# Patient Record
Sex: MALE | Race: WHITE | NOT HISPANIC OR LATINO | Employment: OTHER | URBAN - NONMETROPOLITAN AREA
[De-identification: names, ages, dates, MRNs, and addresses within clinical notes are randomized per-mention and may not be internally consistent; named-entity substitution may affect disease eponyms.]

---

## 2024-03-04 ENCOUNTER — OFFICE VISIT (OUTPATIENT)
Dept: PRIMARY CARE | Facility: CLINIC | Age: 49
End: 2024-03-04
Payer: MEDICARE

## 2024-03-04 VITALS
DIASTOLIC BLOOD PRESSURE: 82 MMHG | WEIGHT: 133.2 LBS | HEIGHT: 63 IN | HEART RATE: 70 BPM | OXYGEN SATURATION: 98 % | BODY MASS INDEX: 23.6 KG/M2 | SYSTOLIC BLOOD PRESSURE: 126 MMHG

## 2024-03-04 DIAGNOSIS — F17.210 CIGARETTE NICOTINE DEPENDENCE WITHOUT COMPLICATION: ICD-10-CM

## 2024-03-04 DIAGNOSIS — E03.9 HYPOTHYROIDISM, UNSPECIFIED TYPE: Primary | ICD-10-CM

## 2024-03-04 PROBLEM — E03.8 OTHER SPECIFIED HYPOTHYROIDISM: Status: ACTIVE | Noted: 2024-03-04

## 2024-03-04 PROCEDURE — 3008F BODY MASS INDEX DOCD: CPT | Performed by: INTERNAL MEDICINE

## 2024-03-04 PROCEDURE — 99203 OFFICE O/P NEW LOW 30 MIN: CPT | Performed by: INTERNAL MEDICINE

## 2024-03-04 RX ORDER — LEVOTHYROXINE SODIUM 88 UG/1
88 TABLET ORAL DAILY
COMMUNITY
Start: 2024-02-09 | End: 2024-03-04 | Stop reason: SDUPTHER

## 2024-03-04 RX ORDER — LEVOTHYROXINE SODIUM 88 UG/1
88 TABLET ORAL DAILY
Qty: 30 TABLET | Refills: 0 | Status: SHIPPED | OUTPATIENT
Start: 2024-03-04 | End: 2024-04-09 | Stop reason: SDUPTHER

## 2024-03-04 ASSESSMENT — PATIENT HEALTH QUESTIONNAIRE - PHQ9
2. FEELING DOWN, DEPRESSED OR HOPELESS: NOT AT ALL
1. LITTLE INTEREST OR PLEASURE IN DOING THINGS: NOT AT ALL
SUM OF ALL RESPONSES TO PHQ9 QUESTIONS 1 AND 2: 0

## 2024-03-04 NOTE — PROGRESS NOTES
"Subjective   Patient ID: Milton Garcia is a 48 y.o. male who presents for New Patient Visit     HPI    New PCP    Hypothyroidism on rx no side effects    Smoking cessation discussed    Diet / exercise rev'd      Review of Systems   All other systems reviewed and are negative.      Objective   /82   Pulse 70   Ht 1.6 m (5' 3\")   Wt 60.4 kg (133 lb 3.2 oz)   SpO2 98%   BMI 23.60 kg/m²   No results found for: \"WBC\", \"HGB\", \"HCT\", \"PLT\", \"CHOL\", \"TRIG\", \"HDL\", \"LDLDIRECT\", \"ALT\", \"AST\", \"NA\", \"K\", \"CL\", \"CREATININE\", \"BUN\", \"CO2\", \"TSH\", \"PSA\", \"INR\", \"GLUF\", \"HGBA1C\", \"ALBUR\"        Physical Exam  Vitals reviewed.   Constitutional:       Appearance: Normal appearance. He is normal weight.   HENT:      Head: Normocephalic and atraumatic.      Mouth/Throat:      Pharynx: No posterior oropharyngeal erythema.   Eyes:      General: No scleral icterus.     Conjunctiva/sclera: Conjunctivae normal.      Pupils: Pupils are equal, round, and reactive to light.   Cardiovascular:      Rate and Rhythm: Normal rate and regular rhythm.      Heart sounds: Normal heart sounds.   Pulmonary:      Effort: No respiratory distress.      Breath sounds: No wheezing.   Abdominal:      General: Abdomen is flat. Bowel sounds are normal. There is no distension.      Palpations: Abdomen is soft. There is no mass.      Tenderness: There is no abdominal tenderness. There is no rebound.   Musculoskeletal:         General: Normal range of motion.      Cervical back: Normal range of motion and neck supple.   Skin:     General: Skin is warm and dry.   Neurological:      General: No focal deficit present.      Mental Status: He is alert and oriented to person, place, and time. Mental status is at baseline.   Psychiatric:         Mood and Affect: Mood normal.         Behavior: Behavior normal.         Thought Content: Thought content normal.         Judgment: Judgment normal.       Problem List Items Addressed This Visit    None  Visit " Diagnoses         Codes    Hypothyroidism, unspecified type    -  Primary E03.9    Cigarette nicotine dependence without complication     F17.210    BMI 23.0-23.9, adult     Z68.23          Assessment/Plan     New PCP    Hypothyroidism on rx no side effects    Smoking cessation discussed    Diet / exercise rev'd    Prostate none  Colonoscopy none  CT chest lung cancer screening n/a  immunizations rev'd shingrix  BMI 23.6    Check lab results  2 months ago from [place]    Follow up 6 weeks / yearly physical

## 2024-04-09 ENCOUNTER — OFFICE VISIT (OUTPATIENT)
Dept: PRIMARY CARE | Facility: CLINIC | Age: 49
End: 2024-04-09
Payer: MEDICARE

## 2024-04-09 VITALS
WEIGHT: 136.2 LBS | HEIGHT: 63 IN | DIASTOLIC BLOOD PRESSURE: 76 MMHG | HEART RATE: 100 BPM | OXYGEN SATURATION: 99 % | BODY MASS INDEX: 24.13 KG/M2 | SYSTOLIC BLOOD PRESSURE: 132 MMHG

## 2024-04-09 DIAGNOSIS — E03.9 HYPOTHYROIDISM, UNSPECIFIED TYPE: ICD-10-CM

## 2024-04-09 DIAGNOSIS — E55.9 VITAMIN D DEFICIENCY: ICD-10-CM

## 2024-04-09 DIAGNOSIS — Z83.3 FAMILY HISTORY OF DIABETES MELLITUS: ICD-10-CM

## 2024-04-09 DIAGNOSIS — F17.210 CIGARETTE NICOTINE DEPENDENCE WITHOUT COMPLICATION: ICD-10-CM

## 2024-04-09 DIAGNOSIS — Z00.00 ANNUAL PHYSICAL EXAM: Primary | ICD-10-CM

## 2024-04-09 DIAGNOSIS — Z12.11 ENCOUNTER FOR SCREENING FOR MALIGNANT NEOPLASM OF COLON: ICD-10-CM

## 2024-04-09 DIAGNOSIS — Z12.5 ENCOUNTER FOR SCREENING FOR MALIGNANT NEOPLASM OF PROSTATE: ICD-10-CM

## 2024-04-09 PROCEDURE — 99396 PREV VISIT EST AGE 40-64: CPT | Performed by: INTERNAL MEDICINE

## 2024-04-09 PROCEDURE — 99214 OFFICE O/P EST MOD 30 MIN: CPT | Performed by: INTERNAL MEDICINE

## 2024-04-09 PROCEDURE — 3008F BODY MASS INDEX DOCD: CPT | Performed by: INTERNAL MEDICINE

## 2024-04-09 RX ORDER — LEVOTHYROXINE SODIUM 88 UG/1
88 TABLET ORAL DAILY
Qty: 30 TABLET | Refills: 0 | Status: SHIPPED | OUTPATIENT
Start: 2024-04-09 | End: 2024-05-13 | Stop reason: SDUPTHER

## 2024-04-09 ASSESSMENT — PATIENT HEALTH QUESTIONNAIRE - PHQ9
1. LITTLE INTEREST OR PLEASURE IN DOING THINGS: NOT AT ALL
SUM OF ALL RESPONSES TO PHQ9 QUESTIONS 1 AND 2: 0
2. FEELING DOWN, DEPRESSED OR HOPELESS: NOT AT ALL

## 2024-04-09 NOTE — PROGRESS NOTES
"Subjective   Patient ID: Milton Garcia is a 48 y.o. male who presents for Annual Exam (Yearly )  and follow up    HPI    Yearly physical    Prostate none  Colonoscopy none  CT chest lung cancer screening none  immunizations rev'd shingrix  BMI 24.1    Follow up    Feels well    Hypothyroidism on rx no side effects     Smoking cessation discussed    Family history of DM     Diet / exercise rev'd    Review of Systems   All other systems reviewed and are negative.      Objective   /76   Pulse 100   Ht 1.6 m (5' 3\")   Wt 61.8 kg (136 lb 3.2 oz)   SpO2 99%   BMI 24.13 kg/m²   No results found for: \"WBC\", \"HGB\", \"HCT\", \"PLT\", \"CHOL\", \"TRIG\", \"HDL\", \"LDLDIRECT\", \"ALT\", \"AST\", \"NA\", \"K\", \"CL\", \"CREATININE\", \"BUN\", \"CO2\", \"TSH\", \"PSA\", \"INR\", \"GLUF\", \"HGBA1C\", \"ALBUR\"        Physical Exam  Vitals reviewed.   Constitutional:       Appearance: Normal appearance. He is normal weight.   HENT:      Head: Normocephalic and atraumatic.      Mouth/Throat:      Pharynx: No posterior oropharyngeal erythema.   Eyes:      General: No scleral icterus.     Conjunctiva/sclera: Conjunctivae normal.      Pupils: Pupils are equal, round, and reactive to light.   Cardiovascular:      Rate and Rhythm: Normal rate and regular rhythm.      Heart sounds: Normal heart sounds.   Pulmonary:      Effort: No respiratory distress.      Breath sounds: No wheezing.   Abdominal:      General: Abdomen is flat. Bowel sounds are normal. There is no distension.      Palpations: Abdomen is soft. There is no mass.      Tenderness: There is no abdominal tenderness. There is no rebound.   Musculoskeletal:         General: Normal range of motion.      Cervical back: Normal range of motion and neck supple.   Skin:     General: Skin is warm and dry.   Neurological:      General: No focal deficit present.      Mental Status: He is alert and oriented to person, place, and time. Mental status is at baseline.   Psychiatric:         Mood and Affect: Mood " normal.         Behavior: Behavior normal.         Thought Content: Thought content normal.         Judgment: Judgment normal.         Problem List Items Addressed This Visit    None  Visit Diagnoses         Codes    Annual physical exam    -  Primary Z00.00    Relevant Orders    Comprehensive Metabolic Panel    Lipid Panel    TSH with reflex to Free T4 if abnormal    Hypothyroidism, unspecified type     E03.9    Relevant Medications    levothyroxine (Synthroid, Levoxyl) 88 mcg tablet    Cigarette nicotine dependence without complication     F17.210    Relevant Orders    CBC and Auto Differential    CT lung screening low dose    Encounter for screening for malignant neoplasm of prostate     Z12.5    Relevant Orders    Prostate Specific Antigen, Screen    Vitamin D deficiency     E55.9    Relevant Orders    Vitamin D 25-Hydroxy,Total (for eval of Vitamin D levels)    Encounter for screening for malignant neoplasm of colon     Z12.11    Relevant Orders    Referral to Gastroenterology    Family history of diabetes mellitus     Z83.3    Relevant Orders    Hemoglobin A1c    BMI 24.0-24.9, adult     Z68.24          Assessment/Plan     Yearly physical    Prostate none  Colonoscopy none  CT chest lung cancer screening none  immunizations rev'd shingrix  BMI 24.1    Follow up    Feels well    Hypothyroidism on rx no side effects     Smoking cessation discussed    Family history of DM     Diet / exercise rev'd    Check labs, CT lung cancer screening  Colonoscopy recommended and ordered    Follow up 3 weeks

## 2024-04-11 ENCOUNTER — LAB (OUTPATIENT)
Dept: LAB | Facility: LAB | Age: 49
End: 2024-04-11
Payer: MEDICARE

## 2024-04-11 DIAGNOSIS — Z12.5 ENCOUNTER FOR SCREENING FOR MALIGNANT NEOPLASM OF PROSTATE: ICD-10-CM

## 2024-04-11 DIAGNOSIS — Z00.00 ANNUAL PHYSICAL EXAM: ICD-10-CM

## 2024-04-11 DIAGNOSIS — Z83.3 FAMILY HISTORY OF DIABETES MELLITUS: ICD-10-CM

## 2024-04-11 DIAGNOSIS — E55.9 VITAMIN D DEFICIENCY: ICD-10-CM

## 2024-04-11 DIAGNOSIS — F17.210 CIGARETTE NICOTINE DEPENDENCE WITHOUT COMPLICATION: ICD-10-CM

## 2024-04-11 LAB
25(OH)D3 SERPL-MCNC: 15 NG/ML (ref 30–100)
ALBUMIN SERPL BCP-MCNC: 4.5 G/DL (ref 3.4–5)
ALP SERPL-CCNC: 48 U/L (ref 33–120)
ALT SERPL W P-5'-P-CCNC: 54 U/L (ref 10–52)
ANION GAP SERPL CALC-SCNC: 14 MMOL/L (ref 10–20)
AST SERPL W P-5'-P-CCNC: 28 U/L (ref 9–39)
BASOPHILS # BLD AUTO: 0.07 X10*3/UL (ref 0–0.1)
BASOPHILS NFR BLD AUTO: 1.1 %
BILIRUB SERPL-MCNC: 1 MG/DL (ref 0–1.2)
BUN SERPL-MCNC: 13 MG/DL (ref 6–23)
CALCIUM SERPL-MCNC: 9.7 MG/DL (ref 8.6–10.3)
CHLORIDE SERPL-SCNC: 102 MMOL/L (ref 98–107)
CHOLEST SERPL-MCNC: 254 MG/DL (ref 0–199)
CHOLESTEROL/HDL RATIO: 5.3
CO2 SERPL-SCNC: 28 MMOL/L (ref 21–32)
CREAT SERPL-MCNC: 0.85 MG/DL (ref 0.5–1.3)
EGFRCR SERPLBLD CKD-EPI 2021: >90 ML/MIN/1.73M*2
EOSINOPHIL # BLD AUTO: 0.15 X10*3/UL (ref 0–0.7)
EOSINOPHIL NFR BLD AUTO: 2.4 %
ERYTHROCYTE [DISTWIDTH] IN BLOOD BY AUTOMATED COUNT: 12.7 % (ref 11.5–14.5)
EST. AVERAGE GLUCOSE BLD GHB EST-MCNC: 103 MG/DL
GLUCOSE SERPL-MCNC: 96 MG/DL (ref 74–99)
HBA1C MFR BLD: 5.2 %
HCT VFR BLD AUTO: 43.9 % (ref 41–52)
HDLC SERPL-MCNC: 48.1 MG/DL
HGB BLD-MCNC: 14.4 G/DL (ref 13.5–17.5)
IMM GRANULOCYTES # BLD AUTO: 0.01 X10*3/UL (ref 0–0.7)
IMM GRANULOCYTES NFR BLD AUTO: 0.2 % (ref 0–0.9)
LDLC SERPL CALC-MCNC: 187 MG/DL
LYMPHOCYTES # BLD AUTO: 2.09 X10*3/UL (ref 1.2–4.8)
LYMPHOCYTES NFR BLD AUTO: 32.9 %
MCH RBC QN AUTO: 30.8 PG (ref 26–34)
MCHC RBC AUTO-ENTMCNC: 32.8 G/DL (ref 32–36)
MCV RBC AUTO: 94 FL (ref 80–100)
MONOCYTES # BLD AUTO: 0.61 X10*3/UL (ref 0.1–1)
MONOCYTES NFR BLD AUTO: 9.6 %
NEUTROPHILS # BLD AUTO: 3.43 X10*3/UL (ref 1.2–7.7)
NEUTROPHILS NFR BLD AUTO: 53.8 %
NON HDL CHOLESTEROL: 206 MG/DL (ref 0–149)
NRBC BLD-RTO: 0 /100 WBCS (ref 0–0)
PLATELET # BLD AUTO: 297 X10*3/UL (ref 150–450)
POTASSIUM SERPL-SCNC: 4.5 MMOL/L (ref 3.5–5.3)
PROT SERPL-MCNC: 7.1 G/DL (ref 6.4–8.2)
PSA SERPL-MCNC: 1.96 NG/ML
RBC # BLD AUTO: 4.68 X10*6/UL (ref 4.5–5.9)
SODIUM SERPL-SCNC: 139 MMOL/L (ref 136–145)
TRIGL SERPL-MCNC: 96 MG/DL (ref 0–149)
TSH SERPL-ACNC: 2.28 MIU/L (ref 0.44–3.98)
VLDL: 19 MG/DL (ref 0–40)
WBC # BLD AUTO: 6.4 X10*3/UL (ref 4.4–11.3)

## 2024-04-11 PROCEDURE — G0103 PSA SCREENING: HCPCS

## 2024-04-11 PROCEDURE — 82306 VITAMIN D 25 HYDROXY: CPT

## 2024-04-11 PROCEDURE — 36415 COLL VENOUS BLD VENIPUNCTURE: CPT

## 2024-04-11 PROCEDURE — 83036 HEMOGLOBIN GLYCOSYLATED A1C: CPT

## 2024-04-25 ENCOUNTER — HOSPITAL ENCOUNTER (OUTPATIENT)
Dept: RADIOLOGY | Facility: HOSPITAL | Age: 49
Discharge: HOME | End: 2024-04-25
Payer: MEDICARE

## 2024-04-25 DIAGNOSIS — F17.210 CIGARETTE NICOTINE DEPENDENCE WITHOUT COMPLICATION: ICD-10-CM

## 2024-04-25 PROCEDURE — 71271 CT THORAX LUNG CANCER SCR C-: CPT

## 2024-05-06 ENCOUNTER — OFFICE VISIT (OUTPATIENT)
Dept: PRIMARY CARE | Facility: CLINIC | Age: 49
End: 2024-05-06
Payer: MEDICARE

## 2024-05-06 ENCOUNTER — HOSPITAL ENCOUNTER (OUTPATIENT)
Dept: RADIOLOGY | Facility: CLINIC | Age: 49
Discharge: HOME | End: 2024-05-06
Payer: MEDICARE

## 2024-05-06 VITALS
TEMPERATURE: 98.2 F | DIASTOLIC BLOOD PRESSURE: 64 MMHG | SYSTOLIC BLOOD PRESSURE: 112 MMHG | WEIGHT: 137.8 LBS | OXYGEN SATURATION: 99 % | HEART RATE: 68 BPM | BODY MASS INDEX: 24.41 KG/M2

## 2024-05-06 DIAGNOSIS — E03.9 HYPOTHYROIDISM, UNSPECIFIED TYPE: ICD-10-CM

## 2024-05-06 DIAGNOSIS — Z71.2 ENCOUNTER TO DISCUSS TEST RESULTS: Primary | ICD-10-CM

## 2024-05-06 DIAGNOSIS — F17.210 CIGARETTE NICOTINE DEPENDENCE WITHOUT COMPLICATION: ICD-10-CM

## 2024-05-06 DIAGNOSIS — E55.9 VITAMIN D DEFICIENCY: ICD-10-CM

## 2024-05-06 DIAGNOSIS — M25.521 RIGHT ELBOW PAIN: ICD-10-CM

## 2024-05-06 DIAGNOSIS — R93.89 ABNORMAL CT OF THE CHEST: ICD-10-CM

## 2024-05-06 DIAGNOSIS — E78.00 HYPERCHOLESTEREMIA: ICD-10-CM

## 2024-05-06 DIAGNOSIS — Z13.9 SCREENING DUE: ICD-10-CM

## 2024-05-06 PROCEDURE — 99214 OFFICE O/P EST MOD 30 MIN: CPT | Performed by: INTERNAL MEDICINE

## 2024-05-06 PROCEDURE — 73080 X-RAY EXAM OF ELBOW: CPT | Mod: RIGHT SIDE | Performed by: RADIOLOGY

## 2024-05-06 PROCEDURE — 73080 X-RAY EXAM OF ELBOW: CPT | Mod: RT

## 2024-05-06 PROCEDURE — 3008F BODY MASS INDEX DOCD: CPT | Performed by: INTERNAL MEDICINE

## 2024-05-06 PROCEDURE — G0446 INTENS BEHAVE THER CARDIO DX: HCPCS | Performed by: INTERNAL MEDICINE

## 2024-05-06 RX ORDER — ERGOCALCIFEROL 1.25 MG/1
1.25 CAPSULE ORAL
Qty: 4 CAPSULE | Refills: 2 | Status: SHIPPED | OUTPATIENT
Start: 2024-05-12 | End: 2024-08-10

## 2024-05-06 NOTE — PROGRESS NOTES
"Subjective   Patient ID: Milton Garcia \"Carter\" is a 48 y.o. male who presents for Follow-up (3 weeks) and Elbow Pain (Joint pain- right elbow x 4 days).  Elbow Pain      Follow up tests    Abnormal CT chest  Pulm consult    Rt elbow pain x 4 days  No known trauma  Likely musculoskeletal   Check x ray    Hypercholesterolemia  Diet discussed  Check CT cardiac score     Hypothyroidism on rx no side effects    Vit D deficiency   Start 50,000 units weekly x 12 weeks  Then recheck for daily dosing  Risk / benefits rev'd     Smoking cessation discussed     Family history of DM     Diet / exercise rev'd     Check CT cardiac screening  Colonoscopy pending    Review of Systems   All other systems reviewed and are negative.      Objective   /64   Pulse 68   Temp 36.8 °C (98.2 °F)   Wt 62.5 kg (137 lb 12.8 oz)   SpO2 99%   BMI 24.41 kg/m²   Lab Results   Component Value Date    WBC 6.4 04/11/2024    HGB 14.4 04/11/2024    HCT 43.9 04/11/2024     04/11/2024    CHOL 254 (H) 04/11/2024    TRIG 96 04/11/2024    HDL 48.1 04/11/2024    ALT 54 (H) 04/11/2024    AST 28 04/11/2024     04/11/2024    K 4.5 04/11/2024     04/11/2024    CREATININE 0.85 04/11/2024    BUN 13 04/11/2024    CO2 28 04/11/2024    TSH 2.28 04/11/2024    HGBA1C 5.2 04/11/2024           Physical Exam  Vitals reviewed.   Constitutional:       Appearance: Normal appearance. He is normal weight.   HENT:      Head: Normocephalic and atraumatic.      Mouth/Throat:      Pharynx: No posterior oropharyngeal erythema.   Eyes:      General: No scleral icterus.     Conjunctiva/sclera: Conjunctivae normal.      Pupils: Pupils are equal, round, and reactive to light.   Cardiovascular:      Rate and Rhythm: Normal rate and regular rhythm.      Heart sounds: Normal heart sounds.   Pulmonary:      Effort: No respiratory distress.      Breath sounds: No wheezing.   Abdominal:      General: Abdomen is flat. Bowel sounds are normal. There is no distension. "      Palpations: Abdomen is soft. There is no mass.      Tenderness: There is no abdominal tenderness. There is no rebound.   Musculoskeletal:         General: Normal range of motion.      Cervical back: Normal range of motion and neck supple.      Comments: Rt lateral epicondylitis   Skin:     General: Skin is warm and dry.   Neurological:      General: No focal deficit present.      Mental Status: He is alert and oriented to person, place, and time. Mental status is at baseline.   Psychiatric:         Mood and Affect: Mood normal.         Behavior: Behavior normal.         Thought Content: Thought content normal.         Judgment: Judgment normal.         Problem List Items Addressed This Visit    None  Visit Diagnoses         Codes    Encounter to discuss test results    -  Primary Z71.2    Right elbow pain     M25.521    Relevant Orders    XR elbow right 3+ views    Abnormal CT of the chest     R93.89    Relevant Orders    Referral to Pulmonology    Hypercholesteremia     E78.00    Hypothyroidism, unspecified type     E03.9    Vitamin D deficiency     E55.9    Relevant Medications    ergocalciferol (Vitamin D-2) 1.25 MG (53341 UT) capsule (Start on 5/12/2024)    Cigarette nicotine dependence without complication     F17.210    Screening due     Z13.9    Relevant Orders    CT cardiac scoring wo IV contrast    BMI 24.0-24.9, adult     Z68.24          Assessment/Plan     Follow up tests    Abnormal CT chest  Pulm consult    Rt elbow pain x 4 days  No known trauma  Likely musculoskeletal   Check x ray  Otc as directed    Hypercholesterolemia  Diet discussed  Check CT cardiac score  I spent 15 minutes face-to-face with this individual discussing their cardiovascular risk and behavioral therapies of nutritional choices, exercise, and elimination of habits contributing to risk. We agreed on plan how they may be able to reduce their current cardiovascular risk.  Patient 10 year cardiac risk estimate calculates :   8.8 %       Hypothyroidism stable on rx no side effects    Vit D deficiency   Start 50,000 units weekly x 12 weeks  Then recheck for daily dosing  Risk / benefits rev'd     Smoking cessation discussed     Family history of DM     Diet / exercise rev'd     Check CT cardiac screening  Colonoscopy pending    Prostate 4-24  Colonoscopy pending  CT chest lung cancer screening 4-24  immunizations rev'd shingrix  BMI 24.4    Follow up 7 weeks

## 2024-05-12 DIAGNOSIS — M25.521 RIGHT ELBOW PAIN: Primary | ICD-10-CM

## 2024-05-13 DIAGNOSIS — E03.9 HYPOTHYROIDISM, UNSPECIFIED TYPE: ICD-10-CM

## 2024-05-13 RX ORDER — LEVOTHYROXINE SODIUM 88 UG/1
88 TABLET ORAL DAILY
Qty: 30 TABLET | Refills: 1 | Status: SHIPPED | OUTPATIENT
Start: 2024-05-13

## 2024-05-15 DIAGNOSIS — M25.521 RIGHT ELBOW PAIN: ICD-10-CM

## 2024-05-16 ENCOUNTER — EVALUATION (OUTPATIENT)
Dept: OCCUPATIONAL THERAPY | Facility: HOSPITAL | Age: 49
End: 2024-05-16
Payer: MEDICARE

## 2024-05-16 DIAGNOSIS — M25.521 RIGHT ELBOW PAIN: Primary | ICD-10-CM

## 2024-05-16 PROCEDURE — 97110 THERAPEUTIC EXERCISES: CPT | Mod: GO | Performed by: OCCUPATIONAL THERAPIST

## 2024-05-16 PROCEDURE — 97165 OT EVAL LOW COMPLEX 30 MIN: CPT | Mod: GO | Performed by: OCCUPATIONAL THERAPIST

## 2024-05-16 ASSESSMENT — LIFESTYLE VARIABLES
AUDIT-C TOTAL SCORE: 2
HOW MANY STANDARD DRINKS CONTAINING ALCOHOL DO YOU HAVE ON A TYPICAL DAY: 1 OR 2
SKIP TO QUESTIONS 9-10: 1
HOW OFTEN DO YOU HAVE A DRINK CONTAINING ALCOHOL: 2-4 TIMES A MONTH
HOW OFTEN DO YOU HAVE SIX OR MORE DRINKS ON ONE OCCASION: NEVER

## 2024-05-16 ASSESSMENT — ENCOUNTER SYMPTOMS
DEPRESSION: 0
OCCASIONAL FEELINGS OF UNSTEADINESS: 0
LOSS OF SENSATION IN FEET: 0

## 2024-05-16 ASSESSMENT — ACTIVITIES OF DAILY LIVING (ADL)
BATHING_ASSISTANCE: INDEPENDENT
ADL_ASSISTANCE: INDEPENDENT

## 2024-05-16 ASSESSMENT — COLUMBIA-SUICIDE SEVERITY RATING SCALE - C-SSRS
1. IN THE PAST MONTH, HAVE YOU WISHED YOU WERE DEAD OR WISHED YOU COULD GO TO SLEEP AND NOT WAKE UP?: NO
6. HAVE YOU EVER DONE ANYTHING, STARTED TO DO ANYTHING, OR PREPARED TO DO ANYTHING TO END YOUR LIFE?: TEENAGE YEARS
2. HAVE YOU ACTUALLY HAD ANY THOUGHTS OF KILLING YOURSELF?: NO
6. HAVE YOU EVER DONE ANYTHING, STARTED TO DO ANYTHING, OR PREPARED TO DO ANYTHING TO END YOUR LIFE?: YES
6. HAVE YOU EVER DONE ANYTHING, STARTED TO DO ANYTHING, OR PREPARED TO DO ANYTHING TO END YOUR LIFE?: NO

## 2024-05-16 ASSESSMENT — PAIN - FUNCTIONAL ASSESSMENT: PAIN_FUNCTIONAL_ASSESSMENT: 0-10

## 2024-05-16 ASSESSMENT — PAIN SCALES - GENERAL: PAINLEVEL_OUTOF10: 1

## 2024-05-16 NOTE — PROGRESS NOTES
"  Occupational Therapy  Occupational Therapy Evaluation    Patient Name: Milton Garcia \"Ed"  MRN: [id number]  : 1975  Today's Date: 2024  Time Calculation  Start Time: 1348  Stop Time: 1430  Time Calculation (min): 42 min  Today's Charges:  OT Evaluation Time Entry  OT Evaluation (Low) Time Entry: 28  OT Therapeutic Procedures Time Entry  Therapeutic Exercise Time Entry: 14    Current Problem  Problem List Items Addressed This Visit             ICD-10-CM    Right elbow pain - Primary M25.521    Relevant Orders    Follow Up In Occupational Therapy     Insurance  Payor: [employer] / Plan: [employer] ADVANTAGE / Product Type: *No Product type* /     Assessment  OT Assessment  OT Assessment Results: Decreased upper extremity strength, Decreased endurance, Decreased IADLs  Strengths: Ability to acquire knowledge, Attitude of self, Support and attitude of living partners  Clinical Presentation: Stable and/or uncomplicated characteristics  Pt. Tolerated session satisfactorily.  Patient is a 49 yo male  s/p unknown elbow injury resulting in limited participation in pain-free ADLs and inability to perform at their prior level of function. Pt. Reports difficulty grasping items and completing yard work with use of R hand. Pt. Displays decreased RUE strength, decreased IADLs and decreased grasp. Pt would benefit from occupational therapy to address the above impairments in order to return to safe and pain-free ADLs and prior level of function.    Plan  Outpatient Plan  OT Plan: ; review elbow stretching  Frequency: 1x/wk  Duration: 6 weeks  Onset Date: 24  Certification Period Start Date: 24  Certification Period End Date: 24  Number of Treatments Authorized: needs auth  Rehab Potential: Good  Plan of Care Agreement: Patient  Planned Interventions: Therapeutic Exercise (82191), Therapeutic Activity (47967), Self-Care/Home Management (86762), Manual Therapy (62522), Neuromuscular " "Re-education (68095), Ultrasound (14165), Kinesiotaping, Hot Packs, and Cold Packs    General  OT Last Visit  OT Received On: 05/16/24  Reason for Referral: R elbow pain  Referred By: Dr Edmond  Family/Caregiver Present: No  Previous Tests/Imaging): x ray - showed tricep bone spur  Medical History Form: Reviewed and scanned into chart   Previous Interventions/Treatments: None  Primary Language: English  Preferred Learning Style: kinesthetic    Red Flags:   Do you have any of the following? No  Osteoporosis    Balance Difficulties/Falls  H/o CA Fever/chills   AM Stiffness Pacemaker Unexplained Weight Changes  Dizziness/Fainting     Unexplained Change in Bowel or Bladder Functions   Unexplained Malaise or Muscle Weakness  Night Pain/Sweats       Social Determinants of Health issues: No  Money  Unemployed/ work conditions  Education/Literacy Childhood experiences   Physical home environment Social supports/coping skills Healthy behaviors Access to healthcare     Subjective  General Comment: \"could my low vitamin D be affecting this?\"  Current condition since injury: Same    Pt.'s goals for therapy: \"make sure the pain is gone to get back my yard work, mowing cutting trees, bushes filling in ditches.\"    Precautions  Precautions  STEADI Fall Risk Score (The score of 4 or more indicates an increased risk of falling): 1  Medical Precautions: No known precautions/limitation    Pain  Pain Assessment  Pain Assessment: 0-10  Pain Score: 1  Pain Type: Acute pain  Pain Location: Elbow  Pain Orientation: Right  Pain Descriptors:  (irritating)  As a result of this session, pt. reported pain increased .  Aggravating Factors: Lifting/Carrying  and Pulling/Pushing   Relieving Factors:  icy hot  and ibuprofen    Cognition  Cognition  Overall Cognitive Status: Within Functional Limits       Prior Level of Function/Home Living   Prior Function Per Pt/Caregiver Report  Level of [place]: Independent with ADLs and functional " transfers, Independent with homemaking with ambulation  Receives Help From: Family  ADL Assistance: Independent  Homemaking Assistance: Independent  Ambulatory Assistance: Independent  Vocational: On disability (social security)  Leisure: fishing, bicycle riding, video games  Hand Dominance: Left  IADL History  Homemaking Responsibilities: Yes  Meal Prep Responsibility: Secondary  Laundry Responsibility: Secondary  Cleaning Responsibility: Secondary  Bill Paying/Finance Responsibility: Secondary  Shopping Responsibility: Secondary  Current License: No  Home Living  Type of Home: House  Lives With: Other (Comment) (family)  Home Living Comments: no concerns  Concerns with home environment? No    Current ADL/IADL Function  ADL Function  ADL  Eating Assistance: Independent  Grooming Assistance: Independent  Bathing Assistance: Independent  UE Dressing Assistance: Independent  LE Dressing Assistance: Independent  Toileting Assistance with Device: Independent  ADL Comments: anything with holding on with R arm makes my elbow hurt  IADL Function  Patient reports    Coordination  Coordination  Movements are Fluid and Coordinated: Yes    Hand Function  Hand Function  Gross Grasp: Functional  Coordination: Functional    ROM/Strength  RUE   RUE : Within Functional Limits  Right Hand Strength -  (lbs)  Handle Setting 2 (lbs): 40.33 lbs (39, 43, 39)  Right Hand Strength - Pinch (lbs)  Lateral (lbs): 14.67 lbs (16, 14, 14)  Tip 2 Point (lbs): 10 lbs (10, 10, 10)  Three Jaw Edd (lbs): 13 lbs (12, 14, 13)  LUE   LUE: Within Functional Limits  Left Hand Strength -  (lbs)  Handle Setting 2 (lbs): 71.67 lbs (75, 66, 74)  Left Hand Strength - Pinch (lbs)  Lateral (lbs): 16 lbs (18, 15, 15)  Tip 2 Point (lbs): 11.33 lbs (10, 12, 12)  Three Jaw Edd (lbs): 17 lbs (17, 16, 18)    Treatment  This therapist instructed and demonstrated interventions to patient, patient completed the following under direct supervision of this  therapist:  Therapeutic Exercise  Therapeutic Exercise Performed: Yes ROM and strengthening exercises completed this date. Elbow extension stretch completed today in standing and supine on mat table. Patient completed in supinated, pronated and neutal positions x5 reps with 15-30s holds as tolerated. Handout provided to patient for home completion.     Education  Education  Individual(s) Educated: Patient  Education Provided: Anatomy & Physiology, Diagnosis & Precautions, Risk and benefits of OT discussed with patient or other, POC discussed and agreed upon  Home Program: PROM, Handout issued  Risk and Benefits Discussed with Patient/Caregiver/Other: yes  Patient/Caregiver Demonstrated Understanding: yes  Plan of Care Discussed and Agreed Upon: yes  Patient Response to Education: Patient/Caregiver Verbalized Understanding of Information, Patient/Caregiver Performed Return Demonstration of Exercises/Activities    HEP Provided Today: Yes - elbow extension stretches  Access Code: [email or web address]  URL: [email or web address]  Date: 05/16/2024  Prepared by: Erum Tate    Exercises  - Supine Elbow Extension Stretch in Supination  - 2 x daily - 5 x weekly - 1 sets - 5 reps - 30 hold  - Standing Elbow Extension with Towel Roll at Wall - Supination  - 2 x daily - 5 x weekly - 1 sets - 5 reps - 30 hold  - Supine Elbow Extension Stretch with Weight  - 2 x daily - 5 x weekly - 1 sets - 5 reps - 30 hold    Outcome Measures  OT Adult Other Outcome Measures  9 Hole Peg Test: RUE: 21.4s; LUE:19.8s  Other Outcome Measures: Quick DASH: 28 raw = 45 (score out of 10 questions answered)      Goals  Active       OT Goals       Pt. will be able to perform self-care, household, work, and or leisure tasks with 0-2/10 pain rating, 100% of the time        Start:  05/16/24    Expected End:  06/28/24       28 raw = 45 on 10 points         Pt. will demonstrate MI with stating and demonstrating home exercise program in order to  improve patient's ability to perform self-care, household, and/or leisure tasks.        Start:  05/16/24    Expected End:  06/07/24            Pt. will increase RUE hand strength to increase participation in self-care, household, and leisure tasks. : 64lbs, three jaw: 15lbs        Start:  05/16/24    Expected End:  06/28/24            Pt. will be able to perform self-care, household, work, and or leisure tasks with 0-2/10 pain rating, 100% of the time        Start:  05/16/24    Expected End:  06/28/24            Pt. will engage in activities related to heavy yard work with independence and no report of pain to return to PLOF.        Start:  05/16/24    Expected End:  06/28/24                ROBINA Calix/L

## 2024-05-23 ENCOUNTER — DOCUMENTATION (OUTPATIENT)
Dept: OCCUPATIONAL THERAPY | Facility: HOSPITAL | Age: 49
End: 2024-05-23
Payer: MEDICARE

## 2024-05-23 ENCOUNTER — APPOINTMENT (OUTPATIENT)
Dept: OCCUPATIONAL THERAPY | Facility: HOSPITAL | Age: 49
End: 2024-05-23
Payer: MEDICARE

## 2024-05-23 NOTE — PROGRESS NOTES
"Occupational Therapy  Therapy Communication Note    Patient Name: Milton Garcia \"Carter\"  MRN: [id number]  Today's Date: 5/23/2024     Discipline: Occupational Therapy    Missed Visit Reason:      Missed Time: Cancel    Comment: Patient canceled OT appt today without reason.   "

## 2024-05-30 ENCOUNTER — DOCUMENTATION (OUTPATIENT)
Dept: OCCUPATIONAL THERAPY | Facility: HOSPITAL | Age: 49
End: 2024-05-30
Payer: MEDICARE

## 2024-05-30 NOTE — PROGRESS NOTES
"Occupational Therapy  Therapy Communication Note    Patient Name: Milton Garcia \"Carter\"  MRN: [id number]  Today's Date: 5/30/2024     Discipline: Occupational Therapy    Missed Time: No Show    Comment: Phoned patient and spoke with him about upcoming appointment. Patient reported he got busy and forgot about appt.   "

## 2024-06-06 ENCOUNTER — APPOINTMENT (OUTPATIENT)
Dept: OCCUPATIONAL THERAPY | Facility: HOSPITAL | Age: 49
End: 2024-06-06
Payer: MEDICARE

## 2024-06-07 ENCOUNTER — APPOINTMENT (OUTPATIENT)
Dept: GASTROENTEROLOGY | Facility: CLINIC | Age: 49
End: 2024-06-07
Payer: MEDICARE

## 2024-06-11 ENCOUNTER — APPOINTMENT (OUTPATIENT)
Dept: GASTROENTEROLOGY | Facility: CLINIC | Age: 49
End: 2024-06-11
Payer: MEDICARE

## 2024-06-12 PROBLEM — F17.200 NICOTINE DEPENDENCE: Status: RESOLVED | Noted: 2024-04-11 | Resolved: 2024-06-12

## 2024-06-12 PROBLEM — R93.89 ABNORMAL COMPUTERIZED AXIAL TOMOGRAPHY OF CHEST: Status: RESOLVED | Noted: 2024-06-12 | Resolved: 2024-06-12

## 2024-06-12 PROBLEM — E78.00 HYPERCHOLESTEROLEMIA: Status: RESOLVED | Noted: 2024-06-12 | Resolved: 2024-06-12

## 2024-06-12 PROBLEM — E55.9 VITAMIN D DEFICIENCY: Status: RESOLVED | Noted: 2024-04-11 | Resolved: 2024-06-12

## 2024-06-13 ENCOUNTER — APPOINTMENT (OUTPATIENT)
Dept: OCCUPATIONAL THERAPY | Facility: HOSPITAL | Age: 49
End: 2024-06-13
Payer: MEDICARE

## 2024-06-14 ENCOUNTER — DOCUMENTATION (OUTPATIENT)
Dept: OCCUPATIONAL THERAPY | Facility: HOSPITAL | Age: 49
End: 2024-06-14
Payer: MEDICARE

## 2024-06-14 NOTE — PROGRESS NOTES
"Occupational Therapy  Therapy Communication Note    Patient Name: Milton Garcia \"Carter\"  MRN: [id number]  Today's Date: 6/13/2024     Discipline: Occupational Therapy    Missed Time: Cancel    Comment: Patient canceled via MyChart.   "

## 2024-06-20 ENCOUNTER — APPOINTMENT (OUTPATIENT)
Dept: OCCUPATIONAL THERAPY | Facility: HOSPITAL | Age: 49
End: 2024-06-20
Payer: MEDICARE

## 2024-06-20 ENCOUNTER — APPOINTMENT (OUTPATIENT)
Dept: PRIMARY CARE | Facility: CLINIC | Age: 49
End: 2024-06-20
Payer: MEDICARE

## 2024-06-20 ENCOUNTER — DOCUMENTATION (OUTPATIENT)
Dept: OCCUPATIONAL THERAPY | Facility: HOSPITAL | Age: 49
End: 2024-06-20

## 2024-06-20 NOTE — PROGRESS NOTES
"Occupational Therapy  Therapy Communication Note    Patient Name: Milton Garcia \"Carter\"  MRN: [id number]  Today's Date: 6/20/2024     Discipline: Occupational Therapy    Missed Time: Cancel    Comment: Patient canceled via my chart. No reason given.   "

## 2024-06-26 ENCOUNTER — APPOINTMENT (OUTPATIENT)
Dept: PULMONOLOGY | Facility: CLINIC | Age: 49
End: 2024-06-26
Payer: MEDICARE

## 2024-06-27 ENCOUNTER — DOCUMENTATION (OUTPATIENT)
Dept: OCCUPATIONAL THERAPY | Facility: HOSPITAL | Age: 49
End: 2024-06-27
Payer: MEDICARE

## 2024-06-27 NOTE — PROGRESS NOTES
"Occupational Therapy    Discharge Summary    Name: Milton Garcia \"Carter\"  MRN: [id number]  : 1975  Date: 24    Discharge Summary: OT    Discharge Information: Date of discharge 24, Date of last visit 24, Date of evaluation 24, Number of attended visits 1, Referred by Dr. Edmond, and Referred for R elbow pain    Therapy Summary: Goals set to address pain, hand strength, yard work, HEP. Patient failed to return to therapy after evaluation.     Discharge Status: Goals not met.      Rehab Discharge Reason: Failed to schedule and/or keep follow-up appointment(s)  "

## 2024-07-31 NOTE — PROGRESS NOTES
"History Of Present Illness  Milton Garcia \"Carter\" is a 48 y.o. male presenting to GI clinic with a chief complaint of colon cancer screening.  He has never had a colonoscopy.    Patient states his PCP referred for colon cancer screening. No diarrhea, abdominal pain. No known family history of colon cancer or GI disease. Skips a day every once in awhile, but has regular daily bms, BSS 2, occasionally skips a day.  Patient denies BRBPR, hematochezia.      Patient had lost weight and was approximately 128 pounds, which is normal in summer due to decreased appetite. He has gained some of the weight back.     Social History  He reports that he has been smoking cigarettes. He has a 16 pack-year smoking history. He has been exposed to tobacco smoke. He has never used smokeless tobacco. He reports current alcohol use of about 1.0 standard drink of alcohol per week. He reports that he does not use drugs.  He does not take NSAIDs on a regular basis    Family History  Family History   Problem Relation Name Age of Onset    Blood clot Mother      Seizures Father      Diabetes Maternal Grandmother      Diabetes Paternal Grandmother       The patient does not have a FH of CRC. he does not have a FH of IBD    Review of Systems   Constitutional:  Positive for appetite change and unexpected weight change. Negative for chills, diaphoresis, fatigue and fever.   Gastrointestinal:  Negative for abdominal distention, abdominal pain, anal bleeding, blood in stool, constipation, diarrhea, nausea, rectal pain and vomiting.        See HPI   All other systems reviewed and are negative.        Physical Exam  Constitutional:       Appearance: He is normal weight.   HENT:      Head: Normocephalic and atraumatic.   Eyes:      Conjunctiva/sclera: Conjunctivae normal.      Pupils: Pupils are equal, round, and reactive to light.   Pulmonary:      Effort: Pulmonary effort is normal.   Abdominal:      General: Bowel sounds are normal. There is no " "distension.      Palpations: Abdomen is soft. There is no mass.      Tenderness: There is no abdominal tenderness. There is no guarding or rebound.      Hernia: No hernia is present.   Musculoskeletal:         General: Normal range of motion.      Cervical back: Normal range of motion.   Skin:     General: Skin is warm and dry.      Coloration: Skin is not jaundiced.   Neurological:      Mental Status: He is alert and oriented to person, place, and time. Mental status is at baseline.   Psychiatric:         Mood and Affect: Mood normal.         Behavior: Behavior normal.          Last Vital Signs  /85   Pulse 76   Ht 1.6 m (5' 3\")   Wt 59.9 kg (132 lb)   SpO2 97%   BMI 23.38 kg/m²      Relevant Results  Lab Results   Component Value Date    WBC 6.4 04/11/2024    HGB 14.4 04/11/2024    HCT 43.9 04/11/2024    MCV 94 04/11/2024     04/11/2024      Lab Results   Component Value Date    GLUCOSE 96 04/11/2024    CALCIUM 9.7 04/11/2024     04/11/2024    K 4.5 04/11/2024    CO2 28 04/11/2024     04/11/2024    BUN 13 04/11/2024    CREATININE 0.85 04/11/2024      Lab Results   Component Value Date    ALT 54 (H) 04/11/2024    AST 28 04/11/2024    ALKPHOS 48 04/11/2024    BILITOT 1.0 04/11/2024    No results found for: \"IRON\", \"TIBC\", \"IRONSAT\", \"FERRITIN\", \"FHKZMDJM92\", \"FOLATE\"  No results found for: \"CALPS\", \"CRP\" No results found for: \"LIPASE\"   Lab Results   Component Value Date    HGBA1C 5.2 04/11/2024        EGD/COLONOSCOPY  Colonoscopy-never    Assessment & Plan  Encounter for screening for malignant neoplasm of colon  Screening colonoscopy ordered  Orders:    Referral to Gastroenterology    Colonoscopy Screening; Average Risk Patient; Future    Follow-up as needed    Subha Tracy, APRN-CNP  "

## 2024-08-06 ENCOUNTER — APPOINTMENT (OUTPATIENT)
Dept: GASTROENTEROLOGY | Facility: CLINIC | Age: 49
End: 2024-08-06
Payer: MEDICARE

## 2024-08-06 VITALS
OXYGEN SATURATION: 97 % | DIASTOLIC BLOOD PRESSURE: 85 MMHG | HEART RATE: 76 BPM | SYSTOLIC BLOOD PRESSURE: 127 MMHG | BODY MASS INDEX: 23.39 KG/M2 | WEIGHT: 132 LBS | HEIGHT: 63 IN

## 2024-08-06 DIAGNOSIS — Z12.11 ENCOUNTER FOR SCREENING FOR MALIGNANT NEOPLASM OF COLON: ICD-10-CM

## 2024-08-06 PROCEDURE — 99202 OFFICE O/P NEW SF 15 MIN: CPT

## 2024-08-06 PROCEDURE — 3008F BODY MASS INDEX DOCD: CPT

## 2024-08-06 ASSESSMENT — ENCOUNTER SYMPTOMS
ABDOMINAL PAIN: 0
FATIGUE: 0
RECTAL PAIN: 0
ANAL BLEEDING: 0
CONSTIPATION: 0
NAUSEA: 0
UNEXPECTED WEIGHT CHANGE: 1
ABDOMINAL DISTENTION: 0
CHILLS: 0
APPETITE CHANGE: 1
VOMITING: 0
ROS GI COMMENTS: SEE HPI
DIAPHORESIS: 0
BLOOD IN STOOL: 0
DIARRHEA: 0
FEVER: 0

## 2024-08-27 DIAGNOSIS — E03.9 HYPOTHYROIDISM, UNSPECIFIED TYPE: ICD-10-CM

## 2024-08-30 RX ORDER — LEVOTHYROXINE SODIUM 88 UG/1
88 TABLET ORAL DAILY
Qty: 30 TABLET | Refills: 0 | Status: SHIPPED | OUTPATIENT
Start: 2024-08-30 | End: 2024-09-29

## 2024-10-09 ENCOUNTER — APPOINTMENT (OUTPATIENT)
Dept: PREADMISSION TESTING | Facility: HOSPITAL | Age: 49
End: 2024-10-09
Payer: MEDICARE

## 2024-10-10 ENCOUNTER — PRE-ADMISSION TESTING (OUTPATIENT)
Dept: PREADMISSION TESTING | Facility: HOSPITAL | Age: 49
End: 2024-10-10
Payer: MEDICARE

## 2024-10-10 VITALS — HEIGHT: 63 IN | WEIGHT: 160 LBS | BODY MASS INDEX: 28.35 KG/M2

## 2024-10-10 ASSESSMENT — DUKE ACTIVITY SCORE INDEX (DASI)
CAN YOU DO YARD WORK LIKE RAKING LEAVES, WEEDING OR PUSHING A MOWER: YES
CAN YOU HAVE SEXUAL RELATIONS: YES
CAN YOU TAKE CARE OF YOURSELF (EAT, DRESS, BATHE, OR USE TOILET): YES
CAN YOU WALK INDOORS, SUCH AS AROUND YOUR HOUSE: YES
CAN YOU CLIMB A FLIGHT OF STAIRS OR WALK UP A HILL: YES
CAN YOU DO HEAVY WORK AROUND THE HOUSE LIKE SCRUBBING FLOORS OR LIFTING AND MOVING HEAVY FURNITURE: YES
CAN YOU DO MODERATE WORK AROUND THE HOUSE LIKE VACUUMING, SWEEPING FLOORS OR CARRYING GROCERIES: YES
CAN YOU RUN A SHORT DISTANCE: YES
CAN YOU WALK A BLOCK OR TWO ON LEVEL GROUND: YES
CAN YOU DO LIGHT WORK AROUND THE HOUSE LIKE DUSTING OR WASHING DISHES: YES
CAN YOU PARTICIPATE IN MODERATE RECREATIONAL ACTIVITIES LIKE GOLF, BOWLING, DANCING, DOUBLES TENNIS OR THROWING A BASEBALL OR FOOTBALL: YES
TOTAL_SCORE: 58.2
CAN YOU PARTICIPATE IN STRENOUS SPORTS LIKE SWIMMING, SINGLES TENNIS, FOOTBALL, BASKETBALL, OR SKIING: YES
DASI METS SCORE: 9.9

## 2024-10-10 NOTE — PREPROCEDURE INSTRUCTIONS
Medication List            Accurate as of October 10, 2024  1:22 PM. Always use your most recent med list.                levothyroxine 88 mcg tablet  Commonly known as: Synthroid, Levoxyl  Take 1 tablet (88 mcg) by mouth once daily.                              NPO Instructions:    NO SOLID FOOD after breakfast the day prior to your procedure-ONLY CLEAR LIQUIDS  You may have clear liquids up to three hours prior to you procedure  Please start your prep and aprox 5p-6p the evening prior to your procedure    Additional Instructions:     Review your medication instructions, take indicated medications  Wear  comfortable loose fitting clothing  Do not use moisturizers, creams, lotions or perfume  All jewelry and valuables should be left at home      We will call you the business day before your procedure between 1-3p to confirm your procedure and arrival time  Please park in the back of the hospital, in the ED parking and proceed to the second floor  Please make arrangements to have a responsible adult take you home and stay with you for 24 hours  Please bring your ID and insurance card to your procedure  When checked in to the outpatient unit, you will be asked to change into a hospital gown and remove all clothing, including underwear  An IV will be inserted   Your family or  will be asked to wait in the waiting room or cafeteria and will be notified when able to come sit with you  Please call [phone] with any further questions    REMINDER:  Please review prep instructions-You have been prescribed the Miralax and Dulcolax prep. This can be purchased over the counter at any drug store. Please follow the prep instructions sent to you with these instructions.

## 2024-10-30 ENCOUNTER — ANESTHESIA EVENT (OUTPATIENT)
Dept: GASTROENTEROLOGY | Facility: HOSPITAL | Age: 49
End: 2024-10-30
Payer: MEDICARE

## 2024-10-30 ENCOUNTER — HOSPITAL ENCOUNTER (OUTPATIENT)
Dept: GASTROENTEROLOGY | Facility: HOSPITAL | Age: 49
Discharge: HOME | End: 2024-10-30
Payer: MEDICARE

## 2024-10-30 ENCOUNTER — ANESTHESIA (OUTPATIENT)
Dept: GASTROENTEROLOGY | Facility: HOSPITAL | Age: 49
End: 2024-10-30
Payer: MEDICARE

## 2024-10-30 VITALS
TEMPERATURE: 97.9 F | HEART RATE: 70 BPM | HEIGHT: 63 IN | WEIGHT: 123.24 LBS | RESPIRATION RATE: 18 BRPM | OXYGEN SATURATION: 100 % | SYSTOLIC BLOOD PRESSURE: 105 MMHG | DIASTOLIC BLOOD PRESSURE: 72 MMHG | BODY MASS INDEX: 21.84 KG/M2

## 2024-10-30 DIAGNOSIS — Z12.11 ENCOUNTER FOR SCREENING FOR MALIGNANT NEOPLASM OF COLON: ICD-10-CM

## 2024-10-30 PROCEDURE — 3700000002 HC GENERAL ANESTHESIA TIME - EACH INCREMENTAL 1 MINUTE

## 2024-10-30 PROCEDURE — 2500000004 HC RX 250 GENERAL PHARMACY W/ HCPCS (ALT 636 FOR OP/ED): Mod: SE | Performed by: NURSE ANESTHETIST, CERTIFIED REGISTERED

## 2024-10-30 PROCEDURE — 7100000009 HC PHASE TWO TIME - INITIAL BASE CHARGE

## 2024-10-30 PROCEDURE — 7100000010 HC PHASE TWO TIME - EACH INCREMENTAL 1 MINUTE

## 2024-10-30 PROCEDURE — 3700000001 HC GENERAL ANESTHESIA TIME - INITIAL BASE CHARGE

## 2024-10-30 PROCEDURE — 45385 COLONOSCOPY W/LESION REMOVAL: CPT | Performed by: SURGERY

## 2024-10-30 RX ORDER — PROPOFOL 10 MG/ML
INJECTION, EMULSION INTRAVENOUS CONTINUOUS PRN
Status: DISCONTINUED | OUTPATIENT
Start: 2024-10-30 | End: 2024-10-30

## 2024-10-30 RX ORDER — SODIUM CHLORIDE 0.9 % (FLUSH) 0.9 %
SYRINGE (ML) INJECTION AS NEEDED
Status: DISCONTINUED | OUTPATIENT
Start: 2024-10-30 | End: 2024-10-30

## 2024-10-30 RX ORDER — LIDOCAINE HYDROCHLORIDE 20 MG/ML
INJECTION, SOLUTION EPIDURAL; INFILTRATION; INTRACAUDAL; PERINEURAL AS NEEDED
Status: DISCONTINUED | OUTPATIENT
Start: 2024-10-30 | End: 2024-10-30

## 2024-10-30 SDOH — HEALTH STABILITY: MENTAL HEALTH: CURRENT SMOKER: 1

## 2024-10-30 ASSESSMENT — ENCOUNTER SYMPTOMS
UNEXPECTED WEIGHT CHANGE: 0
DIARRHEA: 0
CHILLS: 0
BLOOD IN STOOL: 0
SHORTNESS OF BREATH: 0
ABDOMINAL PAIN: 0
FEVER: 0
CONSTIPATION: 0

## 2024-10-30 ASSESSMENT — PAIN - FUNCTIONAL ASSESSMENT
PAIN_FUNCTIONAL_ASSESSMENT: 0-10
PAIN_FUNCTIONAL_ASSESSMENT: 0-10
PAIN_FUNCTIONAL_ASSESSMENT: UNABLE TO SELF-REPORT
PAIN_FUNCTIONAL_ASSESSMENT: 0-10

## 2024-10-30 ASSESSMENT — PAIN DESCRIPTION - DESCRIPTORS: DESCRIPTORS: ACHING

## 2024-10-30 ASSESSMENT — PAIN SCALES - GENERAL
PAINLEVEL_OUTOF10: 7
PAIN_LEVEL: 1
PAINLEVEL_OUTOF10: 0 - NO PAIN

## 2024-10-30 ASSESSMENT — COLUMBIA-SUICIDE SEVERITY RATING SCALE - C-SSRS
6. HAVE YOU EVER DONE ANYTHING, STARTED TO DO ANYTHING, OR PREPARED TO DO ANYTHING TO END YOUR LIFE?: NO
2. HAVE YOU ACTUALLY HAD ANY THOUGHTS OF KILLING YOURSELF?: NO
1. IN THE PAST MONTH, HAVE YOU WISHED YOU WERE DEAD OR WISHED YOU COULD GO TO SLEEP AND NOT WAKE UP?: NO

## 2024-10-31 ASSESSMENT — PAIN SCALES - GENERAL: PAINLEVEL_OUTOF10: 0 - NO PAIN

## 2024-11-05 DIAGNOSIS — E03.9 HYPOTHYROIDISM, UNSPECIFIED TYPE: ICD-10-CM

## 2024-11-05 RX ORDER — LEVOTHYROXINE SODIUM 88 UG/1
88 TABLET ORAL DAILY
Qty: 15 TABLET | Refills: 0 | Status: SHIPPED | OUTPATIENT
Start: 2024-11-05 | End: 2024-11-20

## 2024-11-06 LAB
LABORATORY COMMENT REPORT: NORMAL
PATH REPORT.FINAL DX SPEC: NORMAL
PATH REPORT.GROSS SPEC: NORMAL
PATH REPORT.RELEVANT HX SPEC: NORMAL
PATH REPORT.TOTAL CANCER: NORMAL

## 2024-12-04 DIAGNOSIS — E03.9 HYPOTHYROIDISM, UNSPECIFIED TYPE: ICD-10-CM

## 2024-12-04 RX ORDER — LEVOTHYROXINE SODIUM 88 UG/1
88 TABLET ORAL DAILY
Qty: 30 TABLET | Refills: 1 | Status: SHIPPED | OUTPATIENT
Start: 2024-12-04 | End: 2025-02-02

## 2025-01-07 ENCOUNTER — LAB (OUTPATIENT)
Dept: LAB | Facility: LAB | Age: 50
End: 2025-01-07
Payer: MEDICARE

## 2025-01-07 ENCOUNTER — APPOINTMENT (OUTPATIENT)
Dept: PRIMARY CARE | Facility: CLINIC | Age: 50
End: 2025-01-07
Payer: MEDICARE

## 2025-01-07 VITALS
OXYGEN SATURATION: 99 % | HEART RATE: 60 BPM | BODY MASS INDEX: 23.81 KG/M2 | SYSTOLIC BLOOD PRESSURE: 118 MMHG | TEMPERATURE: 97.7 F | WEIGHT: 134.4 LBS | DIASTOLIC BLOOD PRESSURE: 78 MMHG | HEIGHT: 63 IN

## 2025-01-07 DIAGNOSIS — F10.10 ALCOHOL ABUSE: ICD-10-CM

## 2025-01-07 DIAGNOSIS — R53.83 OTHER FATIGUE: ICD-10-CM

## 2025-01-07 DIAGNOSIS — R91.8 ABNORMAL CT LUNG SCREENING: ICD-10-CM

## 2025-01-07 DIAGNOSIS — E78.00 HYPERCHOLESTEREMIA: ICD-10-CM

## 2025-01-07 DIAGNOSIS — E55.9 VITAMIN D DEFICIENCY: ICD-10-CM

## 2025-01-07 DIAGNOSIS — F17.210 CIGARETTE NICOTINE DEPENDENCE WITHOUT COMPLICATION: ICD-10-CM

## 2025-01-07 DIAGNOSIS — Z00.00 ROUTINE ADULT HEALTH MAINTENANCE: Primary | ICD-10-CM

## 2025-01-07 DIAGNOSIS — E03.9 HYPOTHYROIDISM, UNSPECIFIED TYPE: ICD-10-CM

## 2025-01-07 DIAGNOSIS — Z12.5 ENCOUNTER FOR SCREENING FOR MALIGNANT NEOPLASM OF PROSTATE: ICD-10-CM

## 2025-01-07 DIAGNOSIS — Z00.00 ANNUAL PHYSICAL EXAM: ICD-10-CM

## 2025-01-07 DIAGNOSIS — Z00.00 ENCOUNTER FOR INITIAL ANNUAL WELLNESS VISIT IN MEDICARE PATIENT: ICD-10-CM

## 2025-01-07 LAB
ALBUMIN SERPL BCP-MCNC: 4.9 G/DL (ref 3.4–5)
ALP SERPL-CCNC: 63 U/L (ref 33–120)
ALT SERPL W P-5'-P-CCNC: 40 U/L (ref 10–52)
ANION GAP SERPL CALC-SCNC: 13 MMOL/L (ref 10–20)
AST SERPL W P-5'-P-CCNC: 24 U/L (ref 9–39)
BILIRUB SERPL-MCNC: 1.2 MG/DL (ref 0–1.2)
BUN SERPL-MCNC: 15 MG/DL (ref 6–23)
CALCIUM SERPL-MCNC: 10.2 MG/DL (ref 8.6–10.3)
CHLORIDE SERPL-SCNC: 98 MMOL/L (ref 98–107)
CHOLEST SERPL-MCNC: 255 MG/DL (ref 0–199)
CHOLESTEROL/HDL RATIO: 4.7
CO2 SERPL-SCNC: 30 MMOL/L (ref 21–32)
CREAT SERPL-MCNC: 0.88 MG/DL (ref 0.5–1.3)
EGFRCR SERPLBLD CKD-EPI 2021: >90 ML/MIN/1.73M*2
GLUCOSE SERPL-MCNC: 77 MG/DL (ref 74–99)
HDLC SERPL-MCNC: 54.4 MG/DL
LDLC SERPL CALC-MCNC: 179 MG/DL
NON HDL CHOLESTEROL: 201 MG/DL (ref 0–149)
POTASSIUM SERPL-SCNC: 4.2 MMOL/L (ref 3.5–5.3)
PROT SERPL-MCNC: 7.8 G/DL (ref 6.4–8.2)
SODIUM SERPL-SCNC: 137 MMOL/L (ref 136–145)
T4 FREE SERPL-MCNC: 0.73 NG/DL (ref 0.61–1.12)
TRIGL SERPL-MCNC: 106 MG/DL (ref 0–149)
TSH SERPL-ACNC: 10.98 MIU/L (ref 0.44–3.98)
VLDL: 21 MG/DL (ref 0–40)

## 2025-01-07 PROCEDURE — G0103 PSA SCREENING: HCPCS

## 2025-01-07 PROCEDURE — 93000 ELECTROCARDIOGRAM COMPLETE: CPT | Performed by: INTERNAL MEDICINE

## 2025-01-07 PROCEDURE — 99214 OFFICE O/P EST MOD 30 MIN: CPT | Performed by: INTERNAL MEDICINE

## 2025-01-07 PROCEDURE — 82306 VITAMIN D 25 HYDROXY: CPT

## 2025-01-07 PROCEDURE — G0438 PPPS, INITIAL VISIT: HCPCS | Performed by: INTERNAL MEDICINE

## 2025-01-07 PROCEDURE — 99396 PREV VISIT EST AGE 40-64: CPT | Performed by: INTERNAL MEDICINE

## 2025-01-07 PROCEDURE — 3008F BODY MASS INDEX DOCD: CPT | Performed by: INTERNAL MEDICINE

## 2025-01-07 RX ORDER — LEVOTHYROXINE SODIUM 88 UG/1
88 TABLET ORAL DAILY
Qty: 30 TABLET | Refills: 0 | Status: SHIPPED | OUTPATIENT
Start: 2025-01-07 | End: 2025-02-06

## 2025-01-07 ASSESSMENT — PATIENT HEALTH QUESTIONNAIRE - PHQ9
2. FEELING DOWN, DEPRESSED OR HOPELESS: NOT AT ALL
1. LITTLE INTEREST OR PLEASURE IN DOING THINGS: NOT AT ALL
SUM OF ALL RESPONSES TO PHQ9 QUESTIONS 1 AND 2: 0
2. FEELING DOWN, DEPRESSED OR HOPELESS: NOT AT ALL
SUM OF ALL RESPONSES TO PHQ9 QUESTIONS 1 AND 2: 0
1. LITTLE INTEREST OR PLEASURE IN DOING THINGS: NOT AT ALL

## 2025-01-07 ASSESSMENT — ACTIVITIES OF DAILY LIVING (ADL)
DOING_HOUSEWORK: INDEPENDENT
BATHING: INDEPENDENT
GROCERY_SHOPPING: INDEPENDENT
MANAGING_FINANCES: INDEPENDENT
DRESSING: INDEPENDENT
BATHING: INDEPENDENT
TAKING_MEDICATION: INDEPENDENT
DRESSING: INDEPENDENT

## 2025-01-07 ASSESSMENT — ENCOUNTER SYMPTOMS
OCCASIONAL FEELINGS OF UNSTEADINESS: 0
LOSS OF SENSATION IN FEET: 0
DEPRESSION: 0

## 2025-01-07 NOTE — PROGRESS NOTES
"Subjective   Patient ID: Milton Garcia \"Ed" is a 49 y.o. male who presents for medicare physical ,yearly physical and follow up    HPI    Initial medicare wellness    Yearly physical    Prostate 4-24  Colonoscopy 10-24  polyps  recheck 2027  CT chest lung cancer screening 4-24  immunizations rev'd flu, COVID 19, shingrix  BMI 23.8    Follow up    Abnormal CT chest  Pulm consult not done  Recheck CT lung     Hypercholesterolemia  Diet discussed  Check CT cardiac score not done     Hypothyroidism not taking daily      Vit D deficiency   Not on      Smoking cessation discussed     Family history of DM     Diet / exercise rev'd      Review of Systems   All other systems reviewed and are negative.      Objective   /78   Pulse 60   Temp 36.5 °C (97.7 °F)   Ht 1.6 m (5' 3\")   Wt 61 kg (134 lb 6.4 oz)   SpO2 99%   BMI 23.81 kg/m²   Lab Results   Component Value Date    WBC 6.4 04/11/2024    HGB 14.4 04/11/2024    HCT 43.9 04/11/2024     04/11/2024    CHOL 255 (H) 01/07/2025    TRIG 106 01/07/2025    HDL 54.4 01/07/2025    ALT 40 01/07/2025    AST 24 01/07/2025     01/07/2025    K 4.2 01/07/2025    CL 98 01/07/2025    CREATININE 0.88 01/07/2025    BUN 15 01/07/2025    CO2 30 01/07/2025    TSH 10.98 (H) 01/07/2025    HGBA1C 5.2 04/11/2024           Physical Exam  Vitals reviewed.   Constitutional:       Appearance: Normal appearance. He is normal weight.   HENT:      Head: Normocephalic and atraumatic.      Mouth/Throat:      Pharynx: No posterior oropharyngeal erythema.   Eyes:      General: No scleral icterus.     Conjunctiva/sclera: Conjunctivae normal.      Pupils: Pupils are equal, round, and reactive to light.   Cardiovascular:      Rate and Rhythm: Normal rate and regular rhythm.      Heart sounds: Normal heart sounds.   Pulmonary:      Effort: No respiratory distress.      Breath sounds: No wheezing.   Abdominal:      General: Abdomen is flat. Bowel sounds are normal. There is no distension. "      Palpations: Abdomen is soft. There is no mass.      Tenderness: There is no abdominal tenderness. There is no rebound.   Musculoskeletal:         General: Normal range of motion.      Cervical back: Normal range of motion and neck supple.   Skin:     General: Skin is warm and dry.   Neurological:      General: No focal deficit present.      Mental Status: He is alert and oriented to person, place, and time. Mental status is at baseline.   Psychiatric:         Mood and Affect: Mood normal.         Behavior: Behavior normal.         Thought Content: Thought content normal.         Judgment: Judgment normal.         Problem List Items Addressed This Visit    None  Visit Diagnoses         Codes    Routine adult health maintenance    -  Primary Z00.00    Relevant Orders    Comprehensive Metabolic Panel (Completed)    Lipid Panel (Completed)    TSH with reflex to Free T4 if abnormal (Completed)    Encounter for initial annual wellness visit in Medicare patient     Z00.00    Abnormal CT lung screening     R91.8    Relevant Orders    CT chest wo IV contrast    Hypercholesteremia     E78.00    Hypothyroidism, unspecified type     E03.9    Relevant Medications    levothyroxine (Synthroid, Levoxyl) 88 mcg tablet    Other Relevant Orders    ECG 12 Lead (Completed)    Other fatigue     R53.83    Relevant Orders    CBC and Auto Differential    Vitamin D deficiency     E55.9    Relevant Orders    Vitamin D 25-Hydroxy,Total (for eval of Vitamin D levels)    Cigarette nicotine dependence without complication     F17.210    Alcohol abuse     F10.10    BMI 23.0-23.9, adult     Z68.23          Assessment/Plan     Initial medicare wellness    Yearly physical    Prostate 4-24  Colonoscopy 10-24  polyps  recheck 2027  CT chest lung cancer screening 4-24  immunizations rev'd flu, COVID 19, shingrix  BMI 23.8    Follow up    Abnormal CT chest  Pulm consult not done  Recheck CT lung     Hypercholesterolemia  Diet discussed  EKG ok  1-25  Check CT cardiac score not done     Hypothyroidism not taking daily     Vit D deficiency   Not on      Smoking and alcohol  cessation discussed     Family history of DM     Diet / exercise rev'd    Check labs, CT chest, cardiac scoring    Follow up pending

## 2025-01-08 LAB
25(OH)D3 SERPL-MCNC: 24 NG/ML (ref 30–100)
PSA SERPL-MCNC: 2.51 NG/ML

## 2025-01-09 NOTE — RESULT ENCOUNTER NOTE
Please call the patient regarding his abnormal result.  Take synthroid daily  low fat diet  Follow up 3 weeks

## 2025-02-05 ENCOUNTER — HOSPITAL ENCOUNTER (OUTPATIENT)
Dept: RADIOLOGY | Facility: HOSPITAL | Age: 50
Discharge: HOME | End: 2025-02-05
Payer: MEDICARE

## 2025-02-05 DIAGNOSIS — R91.8 ABNORMAL CT LUNG SCREENING: ICD-10-CM

## 2025-02-05 PROCEDURE — 76380 CAT SCAN FOLLOW-UP STUDY: CPT | Performed by: RADIOLOGY

## 2025-02-05 PROCEDURE — 71250 CT THORAX DX C-: CPT

## 2025-02-06 ENCOUNTER — APPOINTMENT (OUTPATIENT)
Dept: PRIMARY CARE | Facility: CLINIC | Age: 50
End: 2025-02-06
Payer: MEDICARE

## 2025-02-06 VITALS
OXYGEN SATURATION: 99 % | SYSTOLIC BLOOD PRESSURE: 116 MMHG | DIASTOLIC BLOOD PRESSURE: 80 MMHG | HEART RATE: 73 BPM | BODY MASS INDEX: 23.77 KG/M2 | TEMPERATURE: 97.9 F | WEIGHT: 134.2 LBS

## 2025-02-06 DIAGNOSIS — F17.210 CIGARETTE NICOTINE DEPENDENCE WITHOUT COMPLICATION: ICD-10-CM

## 2025-02-06 DIAGNOSIS — E03.9 HYPOTHYROIDISM, UNSPECIFIED TYPE: ICD-10-CM

## 2025-02-06 DIAGNOSIS — R97.20 RISING PSA LEVEL: ICD-10-CM

## 2025-02-06 DIAGNOSIS — R91.8 ABNORMAL CT LUNG SCREENING: ICD-10-CM

## 2025-02-06 DIAGNOSIS — E78.00 HYPERCHOLESTEREMIA: ICD-10-CM

## 2025-02-06 DIAGNOSIS — Z71.2 ENCOUNTER TO DISCUSS TEST RESULTS: Primary | ICD-10-CM

## 2025-02-06 PROCEDURE — G2211 COMPLEX E/M VISIT ADD ON: HCPCS | Performed by: INTERNAL MEDICINE

## 2025-02-06 PROCEDURE — G0446 INTENS BEHAVE THER CARDIO DX: HCPCS | Performed by: INTERNAL MEDICINE

## 2025-02-06 PROCEDURE — 99214 OFFICE O/P EST MOD 30 MIN: CPT | Performed by: INTERNAL MEDICINE

## 2025-02-06 RX ORDER — LEVOTHYROXINE SODIUM 88 UG/1
88 TABLET ORAL DAILY
Qty: 90 TABLET | Refills: 0 | Status: CANCELLED | OUTPATIENT
Start: 2025-02-06 | End: 2025-05-07

## 2025-02-06 NOTE — PROGRESS NOTES
"Subjective   Patient ID: Milton Garcia \"Carter\" is a 49 y.o. male who presents for Follow-up (3 weeks ).    HPI    Follow up CT chest. labs     Abnormal CT chest 2-25  No suspicious lesions  Pulm consult not done     Hypercholesterolemia  Diet discussed  EKG ok 1-25  Check CT cardiac score not done     Hypothyroidism on rx no side effects  Check thyroid today     Vit D deficiency   Not on      Smoking and alcohol  cessation discussed     Family history of DM     Diet / exercise rev'd    Review of Systems   All other systems reviewed and are negative.      Objective   /80   Pulse 73   Temp 36.6 °C (97.9 °F)   Wt 60.9 kg (134 lb 3.2 oz)   SpO2 99%   BMI 23.77 kg/m²   Lab Results   Component Value Date    WBC 6.4 04/11/2024    HGB 14.4 04/11/2024    HCT 43.9 04/11/2024     04/11/2024    CHOL 255 (H) 01/07/2025    TRIG 106 01/07/2025    HDL 54.4 01/07/2025    ALT 40 01/07/2025    AST 24 01/07/2025     01/07/2025    K 4.2 01/07/2025    CL 98 01/07/2025    CREATININE 0.88 01/07/2025    BUN 15 01/07/2025    CO2 30 01/07/2025    TSH 10.98 (H) 01/07/2025    HGBA1C 5.2 04/11/2024           Physical Exam  Vitals reviewed.   Constitutional:       Appearance: Normal appearance. He is normal weight.   HENT:      Head: Normocephalic and atraumatic.      Mouth/Throat:      Pharynx: No posterior oropharyngeal erythema.   Eyes:      General: No scleral icterus.     Conjunctiva/sclera: Conjunctivae normal.      Pupils: Pupils are equal, round, and reactive to light.   Cardiovascular:      Rate and Rhythm: Normal rate and regular rhythm.      Heart sounds: Normal heart sounds.   Pulmonary:      Effort: No respiratory distress.      Breath sounds: No wheezing.   Abdominal:      General: Abdomen is flat. Bowel sounds are normal. There is no distension.      Palpations: Abdomen is soft. There is no mass.      Tenderness: There is no abdominal tenderness. There is no rebound.   Musculoskeletal:         General: Normal " range of motion.      Cervical back: Normal range of motion and neck supple.   Skin:     General: Skin is warm and dry.   Neurological:      General: No focal deficit present.      Mental Status: He is alert and oriented to person, place, and time. Mental status is at baseline.   Psychiatric:         Mood and Affect: Mood normal.         Behavior: Behavior normal.         Thought Content: Thought content normal.         Judgment: Judgment normal.         Problem List Items Addressed This Visit    None  Visit Diagnoses         Codes    Encounter to discuss test results    -  Primary Z71.2    Hypercholesteremia     E78.00    Hypothyroidism, unspecified type     E03.9    Relevant Orders    TSH with reflex to Free T4 if abnormal    Rising PSA level     R97.20    Abnormal CT lung screening     R91.8    Cigarette nicotine dependence without complication     F17.210    BMI 23.0-23.9, adult     Z68.23          Assessment/Plan       Follow up CT chest. labs     Abnormal CT chest 2-25  No suspicious lesions  Pulm consult not done     Hypercholesterolemia  Diet discussed  EKG ok 1-25  Check CT cardiac score not done  I spent 15 minutes face-to-face with this individual discussing their cardiovascular risk and behavioral therapies of nutritional choices, exercise, and elimination of habits contributing to risk. We agreed on plan how they may be able to reduce their current cardiovascular risk.  Patient 10 year cardiac risk estimate calculates :  8.8 %      Hypothyroidism on rx no side effects  Check thyroid today     Vit D deficiency   Not on      Rising PSA  Recheck in 6 months    Smoking and alcohol  cessation discussed     Family history of DM     Diet / exercise rev'd    Prostate 1-25  Colonoscopy 10-24  polyps  recheck 2027  CT chest lung cancer screening 2-25  immunizations rev'd flu, COVID 19, shingrix  BMI 23.8    Follow up 3 months

## 2025-02-07 DIAGNOSIS — E03.9 HYPOTHYROIDISM, UNSPECIFIED TYPE: ICD-10-CM

## 2025-02-07 LAB
T4 FREE SERPL-MCNC: 1.2 NG/DL (ref 0.8–1.8)
TSH SERPL-ACNC: 5.91 MIU/L (ref 0.4–4.5)

## 2025-02-07 RX ORDER — LEVOTHYROXINE SODIUM 100 UG/1
88 TABLET ORAL DAILY
Qty: 30 TABLET | Refills: 2 | Status: SHIPPED | OUTPATIENT
Start: 2025-02-07 | End: 2025-05-08

## 2025-05-08 ENCOUNTER — APPOINTMENT (OUTPATIENT)
Dept: PRIMARY CARE | Facility: CLINIC | Age: 50
End: 2025-05-08
Payer: MEDICARE

## 2025-05-08 VITALS
DIASTOLIC BLOOD PRESSURE: 82 MMHG | BODY MASS INDEX: 24.2 KG/M2 | OXYGEN SATURATION: 100 % | WEIGHT: 136.6 LBS | SYSTOLIC BLOOD PRESSURE: 120 MMHG | TEMPERATURE: 97.7 F | HEART RATE: 67 BPM

## 2025-05-08 DIAGNOSIS — E78.00 HYPERCHOLESTEREMIA: ICD-10-CM

## 2025-05-08 DIAGNOSIS — R93.89 ABNORMAL CT OF THE CHEST: ICD-10-CM

## 2025-05-08 DIAGNOSIS — R97.20 RISING PSA LEVEL: ICD-10-CM

## 2025-05-08 DIAGNOSIS — E55.9 VITAMIN D DEFICIENCY: ICD-10-CM

## 2025-05-08 DIAGNOSIS — F17.210 CIGARETTE NICOTINE DEPENDENCE WITHOUT COMPLICATION: ICD-10-CM

## 2025-05-08 DIAGNOSIS — E03.9 HYPOTHYROIDISM, UNSPECIFIED TYPE: Primary | ICD-10-CM

## 2025-05-08 PROCEDURE — G2211 COMPLEX E/M VISIT ADD ON: HCPCS | Performed by: INTERNAL MEDICINE

## 2025-05-08 PROCEDURE — 99214 OFFICE O/P EST MOD 30 MIN: CPT | Performed by: INTERNAL MEDICINE

## 2025-05-08 RX ORDER — LEVOTHYROXINE SODIUM 100 UG/1
88 TABLET ORAL DAILY
Qty: 30 TABLET | Refills: 2 | Status: CANCELLED | OUTPATIENT
Start: 2025-05-08 | End: 2025-08-06

## 2025-05-08 NOTE — PROGRESS NOTES
"Subjective   Patient ID: Milton Garcia \"Carter\" is a 49 y.o. male who presents for Follow-up (3 month ).  HPI     Routine follow up    Abnormal CT chest 2-25  Recheck 12 months  No suspicious lesions  Pulm consult not done     Hypercholesterolemia  Diet discussed  EKG ok 1-25  Check CT cardiac score not done     Hypothyroidism on rx no side effects  Check thyroid today     Vit D deficiency   Not on      Rising PSA  Recheck in 6 months  6-25     Smoking and alcohol  cessation discussed     Family history of DM     Diet / exercise rev'd    Review of Systems   All other systems reviewed and are negative.      Objective   /82   Pulse 67   Temp 36.5 °C (97.7 °F)   Wt 62 kg (136 lb 9.6 oz)   SpO2 100%   BMI 24.20 kg/m²   Lab Results   Component Value Date    WBC 6.4 04/11/2024    HGB 14.4 04/11/2024    HCT 43.9 04/11/2024     04/11/2024    CHOL 255 (H) 01/07/2025    TRIG 106 01/07/2025    HDL 54.4 01/07/2025    ALT 40 01/07/2025    AST 24 01/07/2025     01/07/2025    K 4.2 01/07/2025    CL 98 01/07/2025    CREATININE 0.88 01/07/2025    BUN 15 01/07/2025    CO2 30 01/07/2025    TSH 5.91 (H) 02/06/2025    HGBA1C 5.2 04/11/2024           Physical Exam  Vitals reviewed.   Constitutional:       Appearance: Normal appearance. He is normal weight.   HENT:      Head: Normocephalic and atraumatic.      Mouth/Throat:      Pharynx: No posterior oropharyngeal erythema.   Eyes:      General: No scleral icterus.     Conjunctiva/sclera: Conjunctivae normal.      Pupils: Pupils are equal, round, and reactive to light.   Cardiovascular:      Rate and Rhythm: Normal rate and regular rhythm.      Heart sounds: Normal heart sounds.   Pulmonary:      Effort: No respiratory distress.      Breath sounds: No wheezing.   Abdominal:      General: Abdomen is flat. Bowel sounds are normal. There is no distension.      Palpations: Abdomen is soft. There is no mass.      Tenderness: There is no abdominal tenderness. There is no " rebound.   Musculoskeletal:         General: Normal range of motion.      Cervical back: Normal range of motion and neck supple.   Skin:     General: Skin is warm and dry.   Neurological:      General: No focal deficit present.      Mental Status: He is alert and oriented to person, place, and time. Mental status is at baseline.   Psychiatric:         Mood and Affect: Mood normal.         Behavior: Behavior normal.         Thought Content: Thought content normal.         Judgment: Judgment normal.         Problem List Items Addressed This Visit    None  Visit Diagnoses         Codes      Hypothyroidism, unspecified type    -  Primary E03.9    Relevant Orders    TSH with reflex to Free T4 if abnormal      Abnormal CT of the chest     R93.89      Hypercholesteremia     E78.00      Rising PSA level     R97.20      Cigarette nicotine dependence without complication     F17.210    Relevant Orders    CT lung screening low dose      Vitamin D deficiency     E55.9      BMI 24.0-24.9, adult     Z68.24          Assessment/Plan       Abnormal CT chest 2-25  Recheck 2-26  No suspicious lesions  Pulm consult not done     Hypercholesterolemia  Diet discussed  EKG ok 1-25  Check CT cardiac score not done     Hypothyroidism on rx no side effects  Check thyroid today     Vit D deficiency   Not on      Rising PSA  Recheck in 6 months  6-25     Smoking and alcohol  cessation discussed     Family history of DM     Diet / exercise rev'd    Prostate 1-25  Colonoscopy 10-24  polyps  recheck 2027  CT chest lung cancer screening 2-25  immunizations rev'd flu, COVID 19, shingrix  BMI 24.2    Follow up 3 months

## 2025-05-09 LAB — TSH SERPL-ACNC: 2.89 MIU/L (ref 0.4–4.5)

## 2025-05-09 RX ORDER — LEVOTHYROXINE SODIUM 100 UG/1
88 TABLET ORAL DAILY
Qty: 90 TABLET | Refills: 0 | Status: SHIPPED | OUTPATIENT
Start: 2025-05-09 | End: 2025-08-07

## 2025-08-11 ENCOUNTER — APPOINTMENT (OUTPATIENT)
Dept: PRIMARY CARE | Facility: CLINIC | Age: 50
End: 2025-08-11
Payer: MEDICARE

## 2025-08-25 DIAGNOSIS — E03.9 HYPOTHYROIDISM, UNSPECIFIED TYPE: ICD-10-CM

## 2025-08-25 RX ORDER — LEVOTHYROXINE SODIUM 100 UG/1
88 TABLET ORAL DAILY
Qty: 15 TABLET | Refills: 0 | Status: SHIPPED | OUTPATIENT
Start: 2025-08-25 | End: 2025-09-09